# Patient Record
Sex: FEMALE | Race: WHITE
[De-identification: names, ages, dates, MRNs, and addresses within clinical notes are randomized per-mention and may not be internally consistent; named-entity substitution may affect disease eponyms.]

---

## 2017-06-10 ENCOUNTER — HEALTH MAINTENANCE LETTER (OUTPATIENT)
Age: 27
End: 2017-06-10

## 2018-02-11 ENCOUNTER — HOSPITAL ENCOUNTER (EMERGENCY)
Dept: HOSPITAL 41 - JD.ED | Age: 28
Discharge: HOME | End: 2018-02-11
Payer: SELF-PAY

## 2018-02-11 DIAGNOSIS — F17.210: ICD-10-CM

## 2018-02-11 DIAGNOSIS — Z88.5: ICD-10-CM

## 2018-02-11 DIAGNOSIS — L02.31: Primary | ICD-10-CM

## 2018-02-11 DIAGNOSIS — Z88.1: ICD-10-CM

## 2018-02-11 NOTE — EDM.PDOC
ED HPI GENERAL MEDICAL PROBLEM





- General


Chief Complaint: Skin Complaint


Stated Complaint: BOIL ON TAILBONE


Time Seen by Provider: 02/11/18 01:15


Source of Information: Reports: Patient


History Limitations: Reports: No Limitations





- History of Present Illness


INITIAL COMMENTS - FREE TEXT/NARRATIVE: 





This is a 27-year-old  female. She has a sore spot on her left 

buttocks next to the cleavage. She states been there for about a week and seems 

to getting bigger and more sore. She's not had any fever or chills. She denies 

any other acute symptoms. She just complains of it feeling very tight there and 

sore and painful when she sits. There is been no drainage from the sore spot.


  ** coccyx


Pain Score (Numeric/FACES): 8





- Related Data


 Allergies











Allergy/AdvReac Type Severity Reaction Status Date / Time


 


amoxicillin Allergy  Rash Verified 02/11/18 01:20


 


clavulanic acid Allergy  Swelling Verified 02/11/18 01:20





[From Augmentin]     


 


codeine Allergy  Edema Verified 02/11/18 01:20











Home Meds: 


 Home Meds





. [No Known Home Meds]  02/11/18 [History]











Past Medical History





- Past Health History


Medical/Surgical History: Denies Medical/Surgical History





Social & Family History





- Family History


Family Medical History: Noncontributory





- Tobacco Use


Smoking Status *Q: Current Every Day Smoker


Years of Tobacco use: 8


Packs/Tins Daily: 1





- Caffeine Use


Caffeine Use: Reports: Energy Drinks, Soda





- Recreational Drug Use


Recreational Drug Use: No





ED ROS GENERAL





- Review of Systems


Review Of Systems: See Below


Constitutional: Denies: Fever, Chills


HEENT: Reports: No Symptoms


Respiratory: Reports: No Symptoms


Cardiovascular: Reports: No Symptoms


Endocrine: Reports: No Symptoms


GI/Abdominal: Reports: Other (As per history of present illness)


: Reports: No Symptoms


Musculoskeletal: Reports: No Symptoms


Skin: Reports: Other (As per history of present illness)


Neurological: Reports: No Symptoms


Psychiatric: Reports: No Symptoms


Hematologic/Lymphatic: Reports: No Symptoms





ED EXAM, SKIN/RASH


Exam: See Below


Exam Limited By: No Limitations


General Appearance: Alert, WD/WN, No Apparent Distress


Eye Exam: Bilateral Eye: Normal Inspection


Ears: Normal External Exam


Nose: Normal Inspection


Throat/Mouth: Normal Inspection, Normal Lips, Normal Voice, No Airway Compromise


Head: Normocephalic


Neck: Supple


Respiratory/Chest: No Respiratory Distress


Rectal (Female) Exam: Other (Patient is noted to have about a 3 cm size 

developing abscess in the upper left buttocks at the cleavage site, this is not 

a pilonidal cyst. It is very firm and very deep at this time but it is swollen 

some, the erythema is confined over the area of hardness, no other acute 

findings)


Back Exam: Normal Inspection


Extremities: Normal Inspection, Normal Range of Motion


Neurological: Alert, Oriented


Psychiatric: Normal Affect, Normal Mood


Skin: Warm, Dry





Course





- Vital Signs


Last Recorded V/S: 





 Last Vital Signs











Temp  97.8 F   02/11/18 01:16


 


Pulse  75   02/11/18 01:16


 


Resp  16   02/11/18 01:16


 


BP  120/71   02/11/18 01:16


 


Pulse Ox  97   02/11/18 01:16














- Re-Assessments/Exams


Free Text/Narrative Re-Assessment/Exam: 





02/11/18 01:38


The abscess is very deep and is not toward the surface. I'm going to put her on 

some antibiotics and also something for pain. She is traveling back to 

[place] tomorrow and I encouraged her to follow up with a walk-in clinic when 

this thing gets more towards the surface for them to open and drain and pack. I 

suggested using warm compresses 2-3 times a day to help this abscess come to 

the surface.





Departure





- Departure


Time of Disposition: 01:39


Disposition: Home, Self-Care 01


Condition: Good


Clinical Impression: 


 Abscess of buttock, left








- Discharge Information


Referrals: 


PCP,None [Primary Care Provider] - 


Additional Instructions: 


Get the medicine from the [facility] and start taking the antibiotics faithfully, 

use the pain medication as needed, use a warm compress to that area 2-3 times a 

day to help the abscess come to the surface, realized that it might open up on 

its own but is still needs to be opened up and shelled out, when you get home 

in [place] go to a walk-in clinic to see if they would be willing to open 

this without having to go to a surgeon, return to this ER if needed